# Patient Record
Sex: MALE | Race: BLACK OR AFRICAN AMERICAN | NOT HISPANIC OR LATINO
[De-identification: names, ages, dates, MRNs, and addresses within clinical notes are randomized per-mention and may not be internally consistent; named-entity substitution may affect disease eponyms.]

---

## 2023-03-06 ENCOUNTER — NON-APPOINTMENT (OUTPATIENT)
Age: 53
End: 2023-03-06

## 2023-03-06 ENCOUNTER — APPOINTMENT (OUTPATIENT)
Dept: ELECTROPHYSIOLOGY | Facility: CLINIC | Age: 53
End: 2023-03-06
Payer: COMMERCIAL

## 2023-03-06 VITALS
DIASTOLIC BLOOD PRESSURE: 99 MMHG | HEART RATE: 84 BPM | SYSTOLIC BLOOD PRESSURE: 160 MMHG | WEIGHT: 315 LBS | OXYGEN SATURATION: 98 % | HEIGHT: 75 IN | BODY MASS INDEX: 39.17 KG/M2 | RESPIRATION RATE: 14 BRPM

## 2023-03-06 DIAGNOSIS — I42.9 CARDIOMYOPATHY, UNSPECIFIED: ICD-10-CM

## 2023-03-06 DIAGNOSIS — Z95.810 PRESENCE OF AUTOMATIC (IMPLANTABLE) CARDIAC DEFIBRILLATOR: ICD-10-CM

## 2023-03-06 PROCEDURE — 99203 OFFICE O/P NEW LOW 30 MIN: CPT | Mod: 25

## 2023-03-06 PROCEDURE — 93284 PRGRMG EVAL IMPLANTABLE DFB: CPT

## 2023-03-06 PROCEDURE — 93000 ELECTROCARDIOGRAM COMPLETE: CPT | Mod: 59

## 2023-03-06 RX ORDER — SACUBITRIL AND VALSARTAN 97; 103 MG/1; MG/1
97-103 TABLET, FILM COATED ORAL
Qty: 180 | Refills: 0 | Status: ACTIVE | COMMUNITY
Start: 2023-02-15

## 2023-03-06 RX ORDER — ATORVASTATIN CALCIUM 40 MG/1
40 TABLET, FILM COATED ORAL
Qty: 90 | Refills: 0 | Status: ACTIVE | COMMUNITY
Start: 2023-01-09

## 2023-03-06 RX ORDER — APIXABAN 5 MG/1
5 TABLET, FILM COATED ORAL
Qty: 180 | Refills: 0 | Status: ACTIVE | COMMUNITY
Start: 2022-03-23

## 2023-03-06 NOTE — DISCUSSION/SUMMARY
[EKG obtained to assist in diagnosis and management of assessed problem(s)] : EKG obtained to assist in diagnosis and management of assessed problem(s) [FreeTextEntry1] : In summary the patient is a 52-year-old gentleman with dilated cardiomyopathy and left bundle branch block status postplacement of a biventricular ICD in 2014.  He has a issue with the ICD lead pertaining to the SVC coil.  The device is now at SHANON.  We discussed the options of simply performing a generator change as the remainder of the ICD lead is currently functioning normally versus an extraction with reimplantation.  The concern is that if a portion of the ICD lead is beginning to fail and portends future failure of the lead.  Since we have to go into the pocket to do a generator change, which exposes the patient to an infectious risk one option would be to deal with the ICD lead now.  In addition a 9-year-old ICD lead will be much easier to extract than 1 that has been in for many more years if it fails at 5 or 10 years down the road.  We discussed the procedures, outcomes and risks of extraction at length.  The risks discussed included, but were not limited to bleeding, infection, AV fistula, vascular tear, cardiac tear, valvular damage, need for emergent surgery and death.  After all options were completely reviewed and all questions were answered the patient has decided to proceed with extraction and reimplantation.  We will hold his Eliquis for 3 days prior.

## 2023-03-06 NOTE — HISTORY OF PRESENT ILLNESS
[FreeTextEntry1] : I had the pleasure of seeing your patient Ronnie Holden today in the arrhythmia clinic of [facility].  As you well know the patient is a delightful 52-year-old gentleman with a history of atrial fibrillation, dilated cardiomyopathy, heart failure and left bundle branch block.  The patient had initially presented with heart failure and the work-up revealed a nonischemic cardiomyopathy.  He had a wide left bundle branch block at 180 ms and underwent implantation of a [employer] Marcellus biventricular ICD in July 2014.  The system includes a Medtronic 6947 dual coil ICD lead, a [employer] 4470 atrial lead and a [employer] Marcellus 1458Q LV lead.  In follow-up the patient was found to have an elevated SVC impedance which was out of range.  The coil was programmed out of the circuit and the patient has now reached SHANON (February 18, 2023).  The patient has never received therapy from his device.\par \par The patient's most recent echocardiogram was on August 11, 2022.  There is severe global LV dysfunction and mild diastolic dysfunction.  The ejection fraction was 35%.  There is normal right ventricular size and function and mild mitral regurgitation.\par \par Today in clinic the patient is overall doing well.  He denies any palpitations, lightheadedness, presyncope or syncope.  His blood pressure today was 160/99 and his pulse was 84 and regular.  His EKG revealed sinus rhythm at 82 bpm with biventricular pacing.  Interrogation of his device reveals it reached SHANON on February 18.  The P wave was greater than 5 mV with impedance of 310 ohms and a threshold of 0.5 V at 0.5 ms.  The R wave was 11.7 mV with impedance of 340 ohms and a threshold 0.75 V at 0.5 ms.  The LV threshold was 1 V at 0.5 ms and the RV coil impedance was 66 ohms.  There was only one mode switch episode and he is atrially pacing 1.4% of the time and BiV pacing over 99% of the time.

## 2023-04-18 ENCOUNTER — NON-APPOINTMENT (OUTPATIENT)
Age: 53
End: 2023-04-18

## 2023-04-21 ENCOUNTER — RESULT REVIEW (OUTPATIENT)
Age: 53
End: 2023-04-21

## 2023-04-21 ENCOUNTER — OUTPATIENT (OUTPATIENT)
Dept: OUTPATIENT SERVICES | Facility: HOSPITAL | Age: 53
LOS: 1 days | End: 2023-04-21
Payer: COMMERCIAL

## 2023-04-21 VITALS
WEIGHT: 315 LBS | OXYGEN SATURATION: 97 % | DIASTOLIC BLOOD PRESSURE: 100 MMHG | TEMPERATURE: 99 F | SYSTOLIC BLOOD PRESSURE: 172 MMHG | HEIGHT: 75 IN | HEART RATE: 74 BPM | RESPIRATION RATE: 18 BRPM

## 2023-04-21 DIAGNOSIS — I42.0 DILATED CARDIOMYOPATHY: ICD-10-CM

## 2023-04-21 DIAGNOSIS — Z95.810 PRESENCE OF AUTOMATIC (IMPLANTABLE) CARDIAC DEFIBRILLATOR: Chronic | ICD-10-CM

## 2023-04-21 DIAGNOSIS — G47.33 OBSTRUCTIVE SLEEP APNEA (ADULT) (PEDIATRIC): ICD-10-CM

## 2023-04-21 DIAGNOSIS — I48.91 UNSPECIFIED ATRIAL FIBRILLATION: ICD-10-CM

## 2023-04-21 DIAGNOSIS — Z98.89 OTHER SPECIFIED POSTPROCEDURAL STATES: Chronic | ICD-10-CM

## 2023-04-21 DIAGNOSIS — I10 ESSENTIAL (PRIMARY) HYPERTENSION: ICD-10-CM

## 2023-04-21 LAB
ANION GAP SERPL CALC-SCNC: 11 MMOL/L — SIGNIFICANT CHANGE UP (ref 5–17)
BLD GP AB SCN SERPL QL: NEGATIVE — SIGNIFICANT CHANGE UP
BUN SERPL-MCNC: 12 MG/DL — SIGNIFICANT CHANGE UP (ref 7–23)
CALCIUM SERPL-MCNC: 9.1 MG/DL — SIGNIFICANT CHANGE UP (ref 8.4–10.5)
CHLORIDE SERPL-SCNC: 107 MMOL/L — SIGNIFICANT CHANGE UP (ref 96–108)
CO2 SERPL-SCNC: 23 MMOL/L — SIGNIFICANT CHANGE UP (ref 22–31)
CREAT SERPL-MCNC: 1.05 MG/DL — SIGNIFICANT CHANGE UP (ref 0.5–1.3)
EGFR: 85 ML/MIN/1.73M2 — SIGNIFICANT CHANGE UP
GLUCOSE SERPL-MCNC: 100 MG/DL — HIGH (ref 70–99)
HCT VFR BLD CALC: 42.8 % — SIGNIFICANT CHANGE UP (ref 39–50)
HGB BLD-MCNC: 13.9 G/DL — SIGNIFICANT CHANGE UP (ref 13–17)
MCHC RBC-ENTMCNC: 29.1 PG — SIGNIFICANT CHANGE UP (ref 27–34)
MCHC RBC-ENTMCNC: 32.5 GM/DL — SIGNIFICANT CHANGE UP (ref 32–36)
MCV RBC AUTO: 89.5 FL — SIGNIFICANT CHANGE UP (ref 80–100)
NRBC # BLD: 0 /100 WBCS — SIGNIFICANT CHANGE UP (ref 0–0)
PLATELET # BLD AUTO: 264 K/UL — SIGNIFICANT CHANGE UP (ref 150–400)
POTASSIUM SERPL-MCNC: 3.9 MMOL/L — SIGNIFICANT CHANGE UP (ref 3.5–5.3)
POTASSIUM SERPL-SCNC: 3.9 MMOL/L — SIGNIFICANT CHANGE UP (ref 3.5–5.3)
RBC # BLD: 4.78 M/UL — SIGNIFICANT CHANGE UP (ref 4.2–5.8)
RBC # FLD: 12.6 % — SIGNIFICANT CHANGE UP (ref 10.3–14.5)
RH IG SCN BLD-IMP: POSITIVE — SIGNIFICANT CHANGE UP
SODIUM SERPL-SCNC: 141 MMOL/L — SIGNIFICANT CHANGE UP (ref 135–145)
WBC # BLD: 7.9 K/UL — SIGNIFICANT CHANGE UP (ref 3.8–10.5)
WBC # FLD AUTO: 7.9 K/UL — SIGNIFICANT CHANGE UP (ref 3.8–10.5)

## 2023-04-21 PROCEDURE — G0463: CPT

## 2023-04-21 PROCEDURE — 86850 RBC ANTIBODY SCREEN: CPT

## 2023-04-21 PROCEDURE — 86900 BLOOD TYPING SEROLOGIC ABO: CPT

## 2023-04-21 PROCEDURE — 36415 COLL VENOUS BLD VENIPUNCTURE: CPT

## 2023-04-21 PROCEDURE — 71046 X-RAY EXAM CHEST 2 VIEWS: CPT

## 2023-04-21 PROCEDURE — 86901 BLOOD TYPING SEROLOGIC RH(D): CPT

## 2023-04-21 PROCEDURE — 86923 COMPATIBILITY TEST ELECTRIC: CPT

## 2023-04-21 PROCEDURE — 71046 X-RAY EXAM CHEST 2 VIEWS: CPT | Mod: 26

## 2023-04-21 PROCEDURE — 80048 BASIC METABOLIC PNL TOTAL CA: CPT

## 2023-04-21 PROCEDURE — 85027 COMPLETE CBC AUTOMATED: CPT

## 2023-04-21 RX ORDER — CEFUROXIME AXETIL 250 MG
1500 TABLET ORAL ONCE
Refills: 0 | Status: COMPLETED | OUTPATIENT
Start: 2023-04-25 | End: 2023-04-25

## 2023-04-21 RX ORDER — CHLORHEXIDINE GLUCONATE 213 G/1000ML
1 SOLUTION TOPICAL ONCE
Refills: 0 | Status: DISCONTINUED | OUTPATIENT
Start: 2023-04-25 | End: 2023-04-25

## 2023-04-21 RX ORDER — LIDOCAINE HCL 20 MG/ML
0.2 VIAL (ML) INJECTION ONCE
Refills: 0 | Status: DISCONTINUED | OUTPATIENT
Start: 2023-04-25 | End: 2023-04-25

## 2023-04-21 RX ORDER — SODIUM CHLORIDE 9 MG/ML
3 INJECTION INTRAMUSCULAR; INTRAVENOUS; SUBCUTANEOUS EVERY 8 HOURS
Refills: 0 | Status: DISCONTINUED | OUTPATIENT
Start: 2023-04-25 | End: 2023-04-25

## 2023-04-21 NOTE — H&P PST ADULT - NSICDXFAMILYHX_GEN_ALL_CORE_FT
FAMILY HISTORY:  Family history of CHF (congestive heart failure), father  age 66yr  Family history of diabetes mellitus, mother  61 yr - hemorrhage

## 2023-04-21 NOTE — H&P PST ADULT - ASSESSMENT
DASI Score:  DASI Activity: able to go up one flight of stairs or walk 1-2 blocks with out difficulty  Loose or removable teeth: denies      CAPRINI SCORE    AGE RELATED RISK FACTORS                                                       MOBILITY RELATED FACTORS  [ ] Age 41-60 years                                            (1 Point)                  [ ] Bed rest                                                        (1 Point)  [ ] Age: 61-74 years                                           (2 Points)                [ ] Plaster cast                                                   (2 Points)  [ ] Age= 75 years                                              (3 Points)                 [ ] Bed bound for more than 72 hours                   (2 Points)    DISEASE RELATED RISK FACTORS                                               GENDER SPECIFIC FACTORS  [ ] Edema in the lower extremities                       (1 Point)                  [ ] Pregnancy                                                     (1 Point)  [ ] Varicose veins                                               (1 Point)                  [ ] Post-partum < 6 weeks                                   (1 Point)             [ ] BMI > 25 Kg/m2                                            (1 Point)                  [ ] Hormonal therapy  or oral contraception            (1 Point)                 [ ] Sepsis (in the previous month)                        (1 Point)                  [ ] History of pregnancy complications  [ ] Pneumonia or serious lung disease                                               [ ] Unexplained or recurrent                       (1 Point)           (in the previous month)                               (1 Point)  [ ] Abnormal pulmonary function test                     (1 Point)                 SURGERY RELATED RISK FACTORS  [ ] Acute myocardial infarction                              (1 Point)                 [ ]  Section                                            (1 Point)  [ ] Congestive heart failure (in the previous month)  (1 Point)                 [ ] Minor surgery                                                 (1 Point)   [ ] Inflammatory bowel disease                             (1 Point)                 [ ] Arthroscopic surgery                                        (2 Points)  [ ] Central venous access                                    (2 Points)                [ ] General surgery lasting more than 45 minutes   (2 Points)       [ ] Stroke (in the previous month)                          (5 Points)               [ ] Elective arthroplasty                                        (5 Points)                                                                                                                                               HEMATOLOGY RELATED FACTORS                                                 TRAUMA RELATED RISK FACTORS  [ ] Prior episodes of VTE                                     (3 Points)                 [ ] Fracture of the hip, pelvis, or leg                       (5 Points)  [ ] Positive family history for VTE                         (3 Points)                 [ ] Acute spinal cord injury (in the previous month)  (5 Points)  [ ] Prothrombin 97668 A                                      (3 Points)                 [ ] Paralysis  (less than 1 month)                          (5 Points)  [ ] Factor V Leiden                                             (3 Points)                 [ ] Multiple Trauma within 1 month                         (5 Points)  [ ] Lupus anticoagulants                                     (3 Points)                                                           [ ] Anticardiolipin antibodies                                (3 Points)                                                       [ ] High homocysteine in the blood                      (3 Points)                                             [ ] Other congenital or acquired thrombophilia       (3 Points)                                                [ ] Heparin induced thrombocytopenia                  (3 Points)                                          Total Score [          ] DASI Score: 7  DASI Activity: Performs all self care, works, drives  Loose or removable teeth: denies      CAPRINI SCORE    AGE RELATED RISK FACTORS                                                       MOBILITY RELATED FACTORS  [x ] Age 41-60 years                                            (1 Point)                  [ ] Bed rest                                                        (1 Point)  [ ] Age: 61-74 years                                           (2 Points)                [ ] Plaster cast                                                   (2 Points)  [ ] Age= 75 years                                              (3 Points)                 [ ] Bed bound for more than 72 hours                   (2 Points)    DISEASE RELATED RISK FACTORS                                               GENDER SPECIFIC FACTORS  [ ] Edema in the lower extremities                       (1 Point)                  [ ] Pregnancy                                                     (1 Point)  [ ] Varicose veins                                               (1 Point)                  [ ] Post-partum < 6 weeks                                   (1 Point)             [x ] BMI > 25 Kg/m2                                            (1 Point)                  [ ] Hormonal therapy  or oral contraception            (1 Point)                 [ ] Sepsis (in the previous month)                        (1 Point)                  [ ] History of pregnancy complications  [ ] Pneumonia or serious lung disease                                               [ ] Unexplained or recurrent                       (1 Point)           (in the previous month)                               (1 Point)  [ ] Abnormal pulmonary function test                     (1 Point)                 SURGERY RELATED RISK FACTORS  [ ] Acute myocardial infarction                              (1 Point)                 [ ]  Section                                            (1 Point)  [ ] Congestive heart failure (in the previous month)  (1 Point)                 [ ] Minor surgery                                                 (1 Point)   [ ] Inflammatory bowel disease                             (1 Point)                 [ ] Arthroscopic surgery                                        (2 Points)  [ ] Central venous access                                    (2 Points)                [x ] General surgery lasting more than 45 minutes   (2 Points)       [ ] Stroke (in the previous month)                          (5 Points)               [ ] Elective arthroplasty                                        (5 Points)                                                                                                                                               HEMATOLOGY RELATED FACTORS                                                 TRAUMA RELATED RISK FACTORS  [ ] Prior episodes of VTE                                     (3 Points)                 [ ] Fracture of the hip, pelvis, or leg                       (5 Points)  [ ] Positive family history for VTE                         (3 Points)                 [ ] Acute spinal cord injury (in the previous month)  (5 Points)  [ ] Prothrombin 27746 A                                      (3 Points)                 [ ] Paralysis  (less than 1 month)                          (5 Points)  [ ] Factor V Leiden                                             (3 Points)                 [ ] Multiple Trauma within 1 month                         (5 Points)  [ ] Lupus anticoagulants                                     (3 Points)                                                           [ ] Anticardiolipin antibodies                                (3 Points)                                                       [ ] High homocysteine in the blood                      (3 Points)                                             [ ] Other congenital or acquired thrombophilia       (3 Points)                                                [ ] Heparin induced thrombocytopenia                  (3 Points)                                          Total Score [  5   ]

## 2023-04-21 NOTE — H&P PST ADULT - NSICDXPASTSURGICALHX_GEN_ALL_CORE_FT
Gab Alfonso Patient Age: 35 year old  MESSAGE: Interpreting service used: No    Insurance on file confirmed with caller: Yes    IM/FP- Orders- Order Request-      Type of order being requested: Vaccination- Td-Tetanus and Diphtheria-      Additional Information: Pt say's he is over due for this and he may poked himself with something, not sure what     Has this been discussed with the provider previously? No- Route message to provider's clinical support pool              Message read back to caller for accuracy: Yes       ALLERGIES:  Dust and Doxycycline hyclate  Current Outpatient Medications   Medication Sig Dispense Refill   • ALPRAZolam (XANAX) 0.25 MG tablet Take 1 tablet by mouth 3 times daily as needed for Anxiety. 30 tablet 0   • ID Now COVID-19 Kit TEST AS DIRECTED     • Multiple Vitamin (MULTIVITAMIN) tablet Take 1 tablet by mouth daily.        No current facility-administered medications for this visit.     PHARMACY to use: see below           Pharmacy preference(s) on file:   [id number] - [place], [place] - [address] AT [facility]  [address]  [facility] [address]  Phone: [phone] Fax: [phone]      CALL BACK INFO: Ok to leave response (including medical information) on answering machine      PCP: Ren Shin MD         INS: Payor: [employer]UE [employer] [place] / Plan: PPO [id number] / Product Type: PPO MISC   PATIENT ADDRESS:  [address] [place] [id number]    
Left message for patient to call back    
Ok   
To Dr Kip Ramirez for Tdap as non clinical  
patient calling has questions about being on antibiotic at this time an if he can schedule transferred to [facility]    Caller returning call to clinical team.     Connected to [facility]- [place]- Connect call to [facility] queue- Route message to provider's clinical support pool  
scheduled  
0 = independent
PAST SURGICAL HISTORY:  H/O eye surgery age 7 yr -  blind    History of implantable cardiac defibrillator (ICD)

## 2023-04-21 NOTE — H&P PST ADULT - HISTORY OF PRESENT ILLNESS
This is a 53 year old male with past medical history HTN, HLD, AFIB on Eliquis, dilated cardio This is a 53 year old obese male with past medical history HTN, HLD, AFIB on Eliquis, dilated CM on Entresto and LBBB with Abbot ICD implanted originally in 2014. Device currently at SHANON and issue with ICD lead/ SVC coil. Today presenting to [facility] for scheduled ICD lead extraction and ICD reimplant on 4/25 with Dr. Martinez  This is a 53 year old obese male with past medical history HTN, HLD, AFIB on Eliquis, HF, dilated CM on Entresto and LBBB with Abbot BIv-ICD implanted originally in 2014. Device currently at SHANON and issue with ICD lead/ SVC coil. Denies ever having shock therapy.  Today presenting to PST for scheduled ICD lead extraction and ICD reimplant on 4/25 with Dr. Martinez

## 2023-04-21 NOTE — H&P PST ADULT - NSANTHOSAYNRD_GEN_A_CORE
JOSÉ MIGUEL precautions/No. JOSÉ MIGUEL screening performed.  STOP BANG Legend: 0-2 = LOW Risk; 3-4 = INTERMEDIATE Risk; 5-8 = HIGH Risk

## 2023-04-21 NOTE — H&P PST ADULT - ATTENDING COMMENTS
seen and agree  no significant changes from my past clinic visit  please see that note for details of the discussion

## 2023-04-21 NOTE — H&P PST ADULT - NSICDXPASTMEDICALHX_GEN_ALL_CORE_FT
PAST MEDICAL HISTORY:  Blind right eye post trauma    CHF (congestive heart failure)     GERD (gastroesophageal reflux disease)     HLD (hyperlipidemia)     HTN (hypertension)     Morbid obesity

## 2023-04-25 ENCOUNTER — TRANSCRIPTION ENCOUNTER (OUTPATIENT)
Age: 53
End: 2023-04-25

## 2023-04-25 ENCOUNTER — OUTPATIENT (OUTPATIENT)
Dept: INPATIENT UNIT | Facility: HOSPITAL | Age: 53
LOS: 1 days | Discharge: HOME | End: 2023-04-25
Payer: COMMERCIAL

## 2023-04-25 VITALS
WEIGHT: 315 LBS | OXYGEN SATURATION: 98 % | TEMPERATURE: 99 F | DIASTOLIC BLOOD PRESSURE: 98 MMHG | RESPIRATION RATE: 18 BRPM | HEIGHT: 75 IN | SYSTOLIC BLOOD PRESSURE: 155 MMHG | HEART RATE: 71 BPM

## 2023-04-25 DIAGNOSIS — Z95.810 PRESENCE OF AUTOMATIC (IMPLANTABLE) CARDIAC DEFIBRILLATOR: Chronic | ICD-10-CM

## 2023-04-25 DIAGNOSIS — Z98.89 OTHER SPECIFIED POSTPROCEDURAL STATES: Chronic | ICD-10-CM

## 2023-04-25 DIAGNOSIS — I42.0 DILATED CARDIOMYOPATHY: ICD-10-CM

## 2023-04-25 PROCEDURE — 71046 X-RAY EXAM CHEST 2 VIEWS: CPT | Mod: 26

## 2023-04-25 PROCEDURE — 93010 ELECTROCARDIOGRAM REPORT: CPT

## 2023-04-25 DEVICE — KIT BRIDGE ACESSORY
Type: IMPLANTABLE DEVICE | Status: NON-FUNCTIONAL
Removed: 2023-04-25

## 2023-04-25 DEVICE — KIT A-LINE 1LUM 20G X 12CM SAFE KIT
Type: IMPLANTABLE DEVICE | Status: NON-FUNCTIONAL
Removed: 2023-04-25

## 2023-04-25 DEVICE — DEVICE LOCKING LOCKING LLD EZ CLEARS
Type: IMPLANTABLE DEVICE | Status: NON-FUNCTIONAL
Removed: 2023-04-25

## 2023-04-25 DEVICE — COIL COMPRESSION ONE-TIE
Type: IMPLANTABLE DEVICE | Status: NON-FUNCTIONAL
Removed: 2023-04-25

## 2023-04-25 DEVICE — IMPLANTABLE DEVICE
Type: IMPLANTABLE DEVICE | Status: NON-FUNCTIONAL
Removed: 2023-04-25

## 2023-04-25 DEVICE — LEAD LOK DVC
Type: IMPLANTABLE DEVICE | Status: NON-FUNCTIONAL
Removed: 2023-04-25

## 2023-04-25 DEVICE — SHEATH PRELUDE 7FX25CM
Type: IMPLANTABLE DEVICE | Status: NON-FUNCTIONAL
Removed: 2023-04-25

## 2023-04-25 DEVICE — SHEATH GLIDELIGHT LASER 16FR
Type: IMPLANTABLE DEVICE | Status: NON-FUNCTIONAL
Removed: 2023-04-25

## 2023-04-25 DEVICE — SURGICEL FIBRILLAR 2 X 4"
Type: IMPLANTABLE DEVICE | Status: NON-FUNCTIONAL
Removed: 2023-04-25

## 2023-04-25 DEVICE — LEAD PACE VENT CAPSUR Z 52CM
Type: IMPLANTABLE DEVICE | Status: NON-FUNCTIONAL
Removed: 2023-04-25

## 2023-04-25 DEVICE — GWIRE ANGL .035X80 STD
Type: IMPLANTABLE DEVICE | Status: NON-FUNCTIONAL
Removed: 2023-04-25

## 2023-04-25 RX ORDER — ATORVASTATIN CALCIUM 80 MG/1
1 TABLET, FILM COATED ORAL
Refills: 0 | DISCHARGE

## 2023-04-25 RX ORDER — CARVEDILOL PHOSPHATE 80 MG/1
1 CAPSULE, EXTENDED RELEASE ORAL
Refills: 0 | DISCHARGE

## 2023-04-25 RX ORDER — ACETAMINOPHEN 500 MG
650 TABLET ORAL EVERY 6 HOURS
Refills: 0 | Status: DISCONTINUED | OUTPATIENT
Start: 2023-04-25 | End: 2023-04-26

## 2023-04-25 RX ORDER — OXYCODONE HYDROCHLORIDE 5 MG/1
5 TABLET ORAL EVERY 6 HOURS
Refills: 0 | Status: DISCONTINUED | OUTPATIENT
Start: 2023-04-25 | End: 2023-04-26

## 2023-04-25 RX ORDER — SACUBITRIL AND VALSARTAN 24; 26 MG/1; MG/1
1 TABLET, FILM COATED ORAL
Refills: 0 | Status: DISCONTINUED | OUTPATIENT
Start: 2023-04-25 | End: 2023-04-26

## 2023-04-25 RX ORDER — OXYCODONE HYDROCHLORIDE 5 MG/1
10 TABLET ORAL EVERY 6 HOURS
Refills: 0 | Status: DISCONTINUED | OUTPATIENT
Start: 2023-04-25 | End: 2023-04-26

## 2023-04-25 RX ORDER — LABETALOL HCL 100 MG
10 TABLET ORAL ONCE
Refills: 0 | Status: COMPLETED | OUTPATIENT
Start: 2023-04-25 | End: 2023-04-25

## 2023-04-25 RX ORDER — FENTANYL CITRATE 50 UG/ML
25 INJECTION INTRAVENOUS
Refills: 0 | Status: DISCONTINUED | OUTPATIENT
Start: 2023-04-25 | End: 2023-04-25

## 2023-04-25 RX ORDER — FUROSEMIDE 40 MG
80 TABLET ORAL DAILY
Refills: 0 | Status: DISCONTINUED | OUTPATIENT
Start: 2023-04-25 | End: 2023-04-26

## 2023-04-25 RX ORDER — AMLODIPINE BESYLATE 2.5 MG/1
5 TABLET ORAL DAILY
Refills: 0 | Status: DISCONTINUED | OUTPATIENT
Start: 2023-04-25 | End: 2023-04-26

## 2023-04-25 RX ORDER — ONDANSETRON 8 MG/1
4 TABLET, FILM COATED ORAL EVERY 6 HOURS
Refills: 0 | Status: DISCONTINUED | OUTPATIENT
Start: 2023-04-25 | End: 2023-04-25

## 2023-04-25 RX ORDER — HYDRALAZINE HCL 50 MG
10 TABLET ORAL ONCE
Refills: 0 | Status: COMPLETED | OUTPATIENT
Start: 2023-04-25 | End: 2023-04-25

## 2023-04-25 RX ORDER — SACUBITRIL AND VALSARTAN 24; 26 MG/1; MG/1
1 TABLET, FILM COATED ORAL
Refills: 0 | DISCHARGE

## 2023-04-25 RX ORDER — CARVEDILOL PHOSPHATE 80 MG/1
25 CAPSULE, EXTENDED RELEASE ORAL EVERY 12 HOURS
Refills: 0 | Status: DISCONTINUED | OUTPATIENT
Start: 2023-04-25 | End: 2023-04-26

## 2023-04-25 RX ORDER — FENTANYL CITRATE 50 UG/ML
50 INJECTION INTRAVENOUS
Refills: 0 | Status: DISCONTINUED | OUTPATIENT
Start: 2023-04-25 | End: 2023-04-25

## 2023-04-25 RX ORDER — ATORVASTATIN CALCIUM 80 MG/1
40 TABLET, FILM COATED ORAL AT BEDTIME
Refills: 0 | Status: DISCONTINUED | OUTPATIENT
Start: 2023-04-25 | End: 2023-04-26

## 2023-04-25 RX ADMIN — AMLODIPINE BESYLATE 5 MILLIGRAM(S): 2.5 TABLET ORAL at 15:58

## 2023-04-25 RX ADMIN — SACUBITRIL AND VALSARTAN 1 TABLET(S): 24; 26 TABLET, FILM COATED ORAL at 22:00

## 2023-04-25 RX ADMIN — CARVEDILOL PHOSPHATE 25 MILLIGRAM(S): 80 CAPSULE, EXTENDED RELEASE ORAL at 22:00

## 2023-04-25 RX ADMIN — Medication 10 MILLIGRAM(S): at 14:12

## 2023-04-25 RX ADMIN — Medication 10 MILLIGRAM(S): at 13:35

## 2023-04-25 RX ADMIN — Medication 10 MILLIGRAM(S): at 12:45

## 2023-04-25 RX ADMIN — ATORVASTATIN CALCIUM 40 MILLIGRAM(S): 80 TABLET, FILM COATED ORAL at 22:00

## 2023-04-25 RX ADMIN — Medication 80 MILLIGRAM(S): at 15:57

## 2023-04-25 NOTE — PRE-OP CHECKLIST - BETA DATE TIME
Noted, thank you. Writer called patient and informed her that per Juan Antonio Jacobs continue Budesonide until we have everything to start the Stelara. She is aware she still has refills on file at the pharmacy and will call to get those filled. She verbalized understanding that we will be contacting her once we hear back on the approval for at home Stelara injections.    25-Apr-2023 06:00

## 2023-04-25 NOTE — CONSULT NOTE ADULT - SUBJECTIVE AND OBJECTIVE BOX
C A R D I O L O G Y  *********************    DATE OF SERVICE: 23    HISTORY OF PRESENT ILLNESS: HPI:  Patient is a 52 y/o Male well known to our office with PMH of HTN, HLD, PAF on Eliquis, NICM/HFrEF and LBBB s/p [employer]cellus BiV-ICD () who was found to have device at SHANON and issue with ICD lead/SVC coil now admitted s/p ICD lead extraction and reimplantation of BiV-ICD on . Patient seen earlier in recovery with mild post op incisional chest pain. Only issue post op was patient hypertensive receiving IV hydralazine and labetalol per RN. Denies SOB, palpitations, dizziness, or syncope.    PAST MEDICAL & SURGICAL HISTORY:  Morbid obesity      HTN (hypertension)      HLD (hyperlipidemia)      CHF (congestive heart failure)      GERD (gastroesophageal reflux disease)      Blind  right eye post trauma      H/O eye surgery  age 7 yr -  blind      History of implantable cardiac defibrillator (ICD)              MEDICATIONS:  MEDICATIONS  (STANDING):  amLODIPine   Tablet 5 milliGRAM(s) Oral daily  atorvastatin 40 milliGRAM(s) Oral at bedtime  carvedilol 25 milliGRAM(s) Oral every 12 hours  furosemide    Tablet 80 milliGRAM(s) Oral daily  sacubitril 97 mG/valsartan 103 mG 1 Tablet(s) Oral two times a day      Allergies    No Known Allergies    Intolerances        FAMILY HISTORY:  Family history of CHF (congestive heart failure)  father  age 66yr    Family history of diabetes mellitus  mother  61 yr - hemorrhage      Non-contributary for premature coronary disease or sudden cardiac death    SOCIAL HISTORY:    [ x] Non-smoker  [ ] Smoker  [ ] Alcohol    FLU VACCINE THIS YEAR STARTS IN AUGUST:  [ ] Yes    [ ] No    IF OVER 65 HAVE YOU EVER HAD A PNA VACCINE:  [ ] Yes    [ ] No       [ ] N/A      REVIEW OF SYSTEMS:  [x ]chest pain  [  ]shortness of breath  [  ]palpitations  [  ]syncope  [ ]near syncope [ ]upper extremity weakness   [ ] lower extremity weakness  [  ]diplopia  [  ]altered mental status   [  ]fevers  [ ]chills [ ]nausea  [ ]vomiting  [  ]dysphagia    [ ]abdominal pain  [ ]melena  [ ]BRBPR    [  ]epistaxis  [  ]rash    [ ]lower extremity edema        [X] All others negative	  [ ] Unable to obtain      LABS:	 	    CARDIAC MARKERS:          Hb Trend:           Creatinine Trend: 1.05<--    Coags:      proBNP:   Lipid Profile:   HgA1c:   TSH:         PHYSICAL EXAM:  T(C): 36.7 (23 @ 15:00), Max: 37.2 (23 @ 08:01)  HR: 77 (23 @ 16:00) (66 - 79)  BP: 169/94 (23 @ 16:00) (155/98 - 185/110)  RR: 14 (23 @ 16:00) (14 - 18)  SpO2: 97% (23 @ 16:00) (94% - 99%)  Wt(kg): --   BMI (kg/m2): 48.4 (23 @ 08:29)  I&O's Summary    2023 07:01  -  2023 16:20  --------------------------------------------------------  IN: 0 mL / OUT: 300 mL / NET: -300 mL        Gen: NAD  HEENT:  (-)icterus (-)pallor  CV: N S1 S2 1/6 TESSA (+)2 Pulses B/l  Resp:  Clear to auscultation B/L, normal effort  GI: (+) BS Soft, NT, ND  Lymph:  (-)Edema, (-)obvious lymphadenopathy  Skin: Warm to touch, Normal turgor  Psych: Appropriate mood and affect      TELEMETRY: SR/	      ECG: NSR, LBBB 	    RADIOLOGY:         CXR: Pending    ASSESSMENT/PLAN: Patient is a 52 y/o Male well known to our office with PMH of HTN, HLD, PAF on Eliquis, NICM/HFrEF and LBBB s/p [employer]us BiV-ICD () who was found to have device at SHANON and issue with ICD lead/SVC coil now admitted s/p ICD lead extraction and reimplantation of BiV-ICD on .    #s/p ICD lead extraction and reimplantation of BiV-ICD  - Post op care per EP  - CXR pending    #Chronic NICM/HFrEF  - Appears well compensated from CHF perspective - continue PO lasix  - Continue home GDMT with Coreg and Entresto    #HTN  - s/p IV hydralazine and labetalol post op  - Continue Coreg, Entresto, Lasix, and Amlodipine    #PAF  - Restart Eliquis for stroke prevention when ok with EP    #HLD  - Continue Lipitor    - Patient to f/u with Dr. Falk after discharge    JADON TrevizoC  Pager: [phone]

## 2023-04-25 NOTE — PATIENT PROFILE ADULT - SAFE PLACE TO LIVE
Sofía Lawton was seen today for hypertension and hyperlipidemia. Diagnoses and all orders for this visit:    Essential hypertension  -     Good control.  -     Continue meds and lifestyle control. CKD (chronic kidney disease) stage 2, GFR 60-89 ml/min  -     Good control.  -     Continue meds and lifestyle control. Mixed hyperlipidemia  -     Good control.  -     Continue meds and lifestyle control. Benign prostatic hyperplasia with nocturia  Trial fish oil at bedtime and /or dinner. Buy Burpless type. Omega 3 fatty acids such as are found in fish oil can decrease urinary urgency and frequency. Each capsule of fish oil should have 800 mg or more of a combination of EPA & DHA - the active omega 3's- per capsule and you take 1 capsule 3 times per day. Or just at dinner and or bedtime. The oil lines the bladder decreasing irritation of the bladder wall and helping prevent bacteria from sticking to the bladder wall so fewer UTI's occur. Fish oil also lowers triglycerides, helps with dry eyes and helps lubricate joints to lower arthritis pain.       Elevated uric acid in blood
no

## 2023-04-25 NOTE — CONSULT NOTE ADULT - ASSESSMENT
Patient seen and examined, agree with above assessment and plan as transcribed above.    - well compensated from a CHF prospective  - D/C planning when ok with EP    Nikunj Ybarra MD, [facility]  BEEPER [phone]

## 2023-04-25 NOTE — PRE-ANESTHESIA EVALUATION ADULT - NSANTHPMHFT_GEN_ALL_CORE
52 yo M h/o morbid obesity, DCM EF 10%, HTN, HLD, lead malfunction here for lead replacement  ET > 4 METS, recent neg stress, on RA  No Dysphagia  ROS otherwise neg

## 2023-04-26 ENCOUNTER — TRANSCRIPTION ENCOUNTER (OUTPATIENT)
Age: 53
End: 2023-04-26

## 2023-04-26 VITALS
HEART RATE: 75 BPM | TEMPERATURE: 98 F | SYSTOLIC BLOOD PRESSURE: 144 MMHG | OXYGEN SATURATION: 98 % | DIASTOLIC BLOOD PRESSURE: 87 MMHG | RESPIRATION RATE: 15 BRPM

## 2023-04-26 DIAGNOSIS — E78.5 HYPERLIPIDEMIA, UNSPECIFIED: ICD-10-CM

## 2023-04-26 LAB
ANION GAP SERPL CALC-SCNC: 12 MMOL/L — SIGNIFICANT CHANGE UP (ref 5–17)
BUN SERPL-MCNC: 14 MG/DL — SIGNIFICANT CHANGE UP (ref 7–23)
CALCIUM SERPL-MCNC: 9 MG/DL — SIGNIFICANT CHANGE UP (ref 8.4–10.5)
CHLORIDE SERPL-SCNC: 105 MMOL/L — SIGNIFICANT CHANGE UP (ref 96–108)
CO2 SERPL-SCNC: 21 MMOL/L — LOW (ref 22–31)
CREAT SERPL-MCNC: 0.98 MG/DL — SIGNIFICANT CHANGE UP (ref 0.5–1.3)
EGFR: 93 ML/MIN/1.73M2 — SIGNIFICANT CHANGE UP
GLUCOSE SERPL-MCNC: 120 MG/DL — HIGH (ref 70–99)
HCT VFR BLD CALC: 43.7 % — SIGNIFICANT CHANGE UP (ref 39–50)
HGB BLD-MCNC: 14.6 G/DL — SIGNIFICANT CHANGE UP (ref 13–17)
MCHC RBC-ENTMCNC: 29.3 PG — SIGNIFICANT CHANGE UP (ref 27–34)
MCHC RBC-ENTMCNC: 33.4 GM/DL — SIGNIFICANT CHANGE UP (ref 32–36)
MCV RBC AUTO: 87.8 FL — SIGNIFICANT CHANGE UP (ref 80–100)
NRBC # BLD: 0 /100 WBCS — SIGNIFICANT CHANGE UP (ref 0–0)
PLATELET # BLD AUTO: 291 K/UL — SIGNIFICANT CHANGE UP (ref 150–400)
POTASSIUM SERPL-MCNC: 4.4 MMOL/L — SIGNIFICANT CHANGE UP (ref 3.5–5.3)
POTASSIUM SERPL-SCNC: 4.4 MMOL/L — SIGNIFICANT CHANGE UP (ref 3.5–5.3)
RBC # BLD: 4.98 M/UL — SIGNIFICANT CHANGE UP (ref 4.2–5.8)
RBC # FLD: 13 % — SIGNIFICANT CHANGE UP (ref 10.3–14.5)
SODIUM SERPL-SCNC: 138 MMOL/L — SIGNIFICANT CHANGE UP (ref 135–145)
WBC # BLD: 14.34 K/UL — HIGH (ref 3.8–10.5)
WBC # FLD AUTO: 14.34 K/UL — HIGH (ref 3.8–10.5)

## 2023-04-26 PROCEDURE — 85027 COMPLETE CBC AUTOMATED: CPT

## 2023-04-26 PROCEDURE — C1892: CPT

## 2023-04-26 PROCEDURE — 71046 X-RAY EXAM CHEST 2 VIEWS: CPT

## 2023-04-26 PROCEDURE — C1894: CPT

## 2023-04-26 PROCEDURE — 33244 REMOVE ELCTRD TRANSVENOUSLY: CPT

## 2023-04-26 PROCEDURE — 80048 BASIC METABOLIC PNL TOTAL CA: CPT

## 2023-04-26 PROCEDURE — 33225 L VENTRIC PACING LEAD ADD-ON: CPT

## 2023-04-26 PROCEDURE — 93287 PERI-PX DEVICE EVAL & PRGR: CPT | Mod: 59

## 2023-04-26 PROCEDURE — C1882: CPT

## 2023-04-26 PROCEDURE — C1773: CPT

## 2023-04-26 PROCEDURE — 33249 INSJ/RPLCMT DEFIB W/LEAD(S): CPT

## 2023-04-26 PROCEDURE — 76000 FLUOROSCOPY <1 HR PHYS/QHP: CPT

## 2023-04-26 PROCEDURE — C1777: CPT

## 2023-04-26 PROCEDURE — C9399: CPT

## 2023-04-26 PROCEDURE — C2629: CPT

## 2023-04-26 PROCEDURE — 33241 REMOVE PULSE GENERATOR: CPT

## 2023-04-26 PROCEDURE — 93010 ELECTROCARDIOGRAM REPORT: CPT

## 2023-04-26 PROCEDURE — C1898: CPT

## 2023-04-26 PROCEDURE — C1889: CPT

## 2023-04-26 PROCEDURE — C1769: CPT

## 2023-04-26 PROCEDURE — 93005 ELECTROCARDIOGRAM TRACING: CPT

## 2023-04-26 RX ORDER — ACETAMINOPHEN 500 MG
2 TABLET ORAL
Qty: 0 | Refills: 0 | DISCHARGE
Start: 2023-04-26

## 2023-04-26 RX ORDER — APIXABAN 2.5 MG/1
1 TABLET, FILM COATED ORAL
Qty: 0 | Refills: 0 | DISCHARGE

## 2023-04-26 RX ADMIN — CARVEDILOL PHOSPHATE 25 MILLIGRAM(S): 80 CAPSULE, EXTENDED RELEASE ORAL at 09:57

## 2023-04-26 RX ADMIN — AMLODIPINE BESYLATE 5 MILLIGRAM(S): 2.5 TABLET ORAL at 05:56

## 2023-04-26 RX ADMIN — SACUBITRIL AND VALSARTAN 1 TABLET(S): 24; 26 TABLET, FILM COATED ORAL at 09:57

## 2023-04-26 RX ADMIN — Medication 80 MILLIGRAM(S): at 05:56

## 2023-04-26 NOTE — DISCHARGE NOTE PROVIDER - NSDCCPCAREPLAN_GEN_ALL_CORE_FT
PRINCIPAL DISCHARGE DIAGNOSIS  Diagnosis: Dilated cardiomyopathy  Assessment and Plan of Treatment: Continue with follow-up visits to your electrophysiology team and with your home remote device monitoring (if applicable). Continue your medications as prescribed. Monitor your left chest wall site for swelling, bleeding.      SECONDARY DISCHARGE DIAGNOSES  Diagnosis: Afib  Assessment and Plan of Treatment: Continue with your cardiologist and primary care MD. Continue your current medications. Call your physician for palpitations, feelings of rapid heart beat, lightheadedness, or dizziness. Continue Eliquis as prescribed.    Diagnosis: HTN (hypertension)  Assessment and Plan of Treatment: Continue with your blood pressure medications; eat a heart healthy diet with low salt diet; exercise regularly (consult with your physician or cardiologist first); maintain a heart healthy weight; if you smoke - quit (A resource to help you stop smoking is the [employer] – phone number 834-768-2689.); include healthy ways to manage stress. Continue to follow with your primary care physician or cardiologist.    Diagnosis: HLD (hyperlipidemia)  Assessment and Plan of Treatment: Continue with your cholesterol medications. Eat a heart healthy diet that is low in saturated fats and salt, and includes whole grains, fruits, vegetables and lean protein; exercise regularly (consult with your physician or cardiologist first); maintain a heart healthy weight; if you smoke - quit (A resource to help you stop smoking is the [employer] [facility] [employer] – phone number 203-085-9544.). Continue to follow with your primary physician or cardiologist.

## 2023-04-26 NOTE — DISCHARGE NOTE NURSING/CASE MANAGEMENT/SOCIAL WORK - NSDCPEFALRISK_GEN_ALL_CORE
For information on Fall & Injury Prevention, visit: [email or web address] OR  [email or web address] OR  https://www.cdc.gov/steadi/patient.html

## 2023-04-26 NOTE — DISCHARGE NOTE PROVIDER - CARE PROVIDER_API CALL
Therese Martinez)  Internal Medicine  [address] [place] [address]  Phone: [phone]  Fax: [phone]  Follow Up Time:    Therese Martinez)  Internal Medicine  [address] [place] [address]  Phone: [phone]  Fax: [phone]  Established Patient  Scheduled Appointment: 05/08/2023 01:40 PM

## 2023-04-26 NOTE — DISCHARGE NOTE PROVIDER - PROVIDER TOKENS
PROVIDER:[TOKEN:[[id number]]] PROVIDER:[TOKEN:[[id number]],SCHEDULEDAPPT:[05/08/2023],SCHEDULEDAPPTTIME:[01:40 PM],ESTABLISHEDPATIENT:[T]]

## 2023-04-26 NOTE — PROGRESS NOTE ADULT - SUBJECTIVE AND OBJECTIVE BOX
C A R D I O L O G Y  **********************************     DATE OF SERVICE: 04-26-23    Patient denies chest pain or shortness of breath.   Review of systems otherwise negative.  	  MEDICATIONS:  MEDICATIONS  (STANDING):  amLODIPine   Tablet 5 milliGRAM(s) Oral daily  atorvastatin 40 milliGRAM(s) Oral at bedtime  carvedilol 25 milliGRAM(s) Oral every 12 hours  furosemide    Tablet 80 milliGRAM(s) Oral daily  sacubitril 97 mG/valsartan 103 mG 1 Tablet(s) Oral two times a day      LABS:	 	    CARDIAC MARKERS:                                14.6   14.34 )-----------( 291      ( 26 Apr 2023 01:41 )             43.7     Hemoglobin: 14.6 g/dL (04-26 @ 01:41)      04-26    138  |  105  |  14  ----------------------------<  120<H>  4.4   |  21<L>  |  0.98    Ca    9.0      26 Apr 2023 01:41      Creatinine Trend: 0.98<--, 1.05<--    COAGS:       proBNP:   Lipid Profile:   HgA1c:   TSH:       PHYSICAL EXAM:  T(C): 36.6 (04-26-23 @ 09:50), Max: 36.8 (04-25-23 @ 20:00)  HR: 75 (04-26-23 @ 09:50) (71 - 107)  BP: 144/87 (04-26-23 @ 09:50) (126/90 - 169/99)  RR: 15 (04-26-23 @ 09:50) (14 - 17)  SpO2: 98% (04-26-23 @ 09:50) (94% - 98%)  Wt(kg): --  I&O's Summary    25 Apr 2023 07:01  -  26 Apr 2023 07:00  --------------------------------------------------------  IN: 0 mL / OUT: 300 mL / NET: -300 mL    26 Apr 2023 07:01  -  26 Apr 2023 14:56  --------------------------------------------------------  IN: 240 mL / OUT: 0 mL / NET: 240 mL    Gen: Appears well in NAD  HEENT:  (-)icterus (-)pallor  CV: N S1 S2 1/6 TESSA (+)2 Pulses B/l  Resp:  Clear to auscultation B/L, normal effort  GI: (+) BS Soft, NT, ND  Lymph:  (-)Edema, (-)obvious lymphadenopathy  Skin: Warm to touch, Normal turgor  Psych: Appropriate mood and affect      TELEMETRY:  70s	      ASSESSMENT/PLAN: Patient is a 54 y/o Male well known to our office with PMH of HTN, HLD, PAF on Eliquis, NICM/HFrEF and LBBB s/p [employer] BiV-ICD (2014) who was found to have device at [facility] and issue with ICD lead/SVC coil now admitted s/p ICD lead extraction and reimplantation of BiV-ICD on 4/25.    #s/p ICD lead extraction and reimplantation of BiV-ICD  - Post op care per EP    #Chronic NICM/HFrEF  - Appears well compensated from CHF perspective - continue PO lasix  - Continue home GDMT with Coreg and Entresto    #HTN  - Continue Coreg, Entresto, Lasix, and Amlodipine    #PAF  - Restart Eliquis for stroke prevention when ok with EP    #HLD  - Continue Lipitor    - D/C planning per EP  - Patient to f/u with Dr. Falk after discharge    JADON TrevizoC  Pager: [phone]      
HPI:  This is a 53 year old obese male with past medical history HTN, HLD, AFIB on Eliquis, HF, dilated CM on Entresto and LBBB with Abbot BIv-ICD implanted originally in 2014. Device currently at [facility] and issue with ICD lead/ SVC coil. Denies ever having shock therapy.  Today presenting to [facility] for scheduled ICD lead extraction and ICD reimplant on 4/25 with Dr. Martinez  (21 Apr 2023 13:40)    Patient is a 53y old  Male who presents with a chief complaint of " They are changing my defibrillator" (21 Apr 2023 13:40)          Allergies    No Known Allergies    Intolerances        Medications:  acetaminophen     Tablet .. 650 milliGRAM(s) Oral every 6 hours PRN  amLODIPine   Tablet 5 milliGRAM(s) Oral daily  atorvastatin 40 milliGRAM(s) Oral at bedtime  carvedilol 25 milliGRAM(s) Oral every 12 hours  furosemide    Tablet 80 milliGRAM(s) Oral daily  oxyCODONE    IR 5 milliGRAM(s) Oral every 6 hours PRN  oxyCODONE    IR 10 milliGRAM(s) Oral every 6 hours PRN  sacubitril 97 mG/valsartan 103 mG 1 Tablet(s) Oral two times a day      Vitals:  T(C): 36.8 (04-25-23 @ 20:00), Max: 37.2 (04-25-23 @ 08:01)  HR: 100 (04-25-23 @ 20:00) (66 - 107)  BP: 135/81 (04-25-23 @ 20:00) (126/90 - 185/110)  BP(mean): 105 (04-25-23 @ 18:00) (102 - 140)  RR: 16 (04-25-23 @ 20:00) (14 - 18)  SpO2: 96% (04-25-23 @ 20:00) (94% - 99%)  Wt(kg): --  Daily Height in cm: 190.5 (25 Apr 2023 08:29)    Daily   I&O's Summary    25 Apr 2023 07:01  -  26 Apr 2023 00:27  --------------------------------------------------------  IN: 0 mL / OUT: 300 mL / NET: -300 mL          Physical Exam:  Appearance: Normal  Eyes: PERRL, EOMI  HENT: Normal oral muscosa, NC/AT  Cardiovascular: S1S2, RRR, No M/R/G, no JVD, No Lower extremity edema  Procedural Access Site: No hematoma, Non-tender to palpation, 2+ pulse, No bruit, No Ecchymosis  Respiratory: Clear to auscultation bilaterally  Gastrointestinal: Soft, Non tender, Normal Bowel Sounds  Musculoskeletal: No clubbing, No joint deformity   Neurologic: Non-focal  Lymphatic: No lymphadenopathy  Psychiatry: AAOx3, Mood & affect appropriate  Skin: No rashes, No ecchymoses, No cyanosis                  Lipid panel   Hgb A1c         ECG: SR 70 bpm

## 2023-04-26 NOTE — DISCHARGE NOTE NURSING/CASE MANAGEMENT/SOCIAL WORK - PATIENT PORTAL LINK FT
You can access the FollowMyHealth Patient Portal offered by [facility] by registering at the following website: [email or web address]. By joining [facility] [employer] [facility]’s FollowMyHealth portal, you will also be able to view your health information using other applications (apps) compatible with our system.

## 2023-04-26 NOTE — DISCHARGE NOTE PROVIDER - NSDCFUADDAPPT_GEN_ALL_CORE_FT
Call the [facility] Cardiology office, [phone], to confirm your outpatient follow up appointment date.

## 2023-04-26 NOTE — DISCHARGE NOTE PROVIDER - HOSPITAL COURSE
HPI:  This is a 53 year old obese male with past medical history HTN, HLD, AFIB on Eliquis, HF, dilated CM on Entresto and LBBB with Abbot BIv-ICD implanted originally in 2014. Device currently at [facility] and issue with ICD lead/ SVC coil. Denies ever having shock therapy.  Today presenting to [facility] for scheduled ICD lead extraction and ICD reimplant on 4/25 with Dr. Martinez  (21 Apr 2023 13:40).    4/25 s/p RV/RA extraction, Medtronic B/V ICD placement. Left chest wall site without swelling, bleeding.   HPI:  This is a 53 year old obese male with past medical history HTN, HLD, AFIB on Eliquis, HF, dilated CM on Entresto and LBBB with Abbot BIv-ICD implanted originally in 2014. Device currently at [facility] and issue with ICD lead/ SVC coil. Denies ever having shock therapy.  Today presenting to [facility] for scheduled ICD lead extraction and ICD reimplant on 4/25 with Dr. Martinez  (21 Apr 2023 13:40).    4/25 s/p RV/RA extraction, Medtronic B/V ICD placement. Left chest wall site without swelling, bleeding.      4/26 Left ACW site mild swelling, after pressure dressing removed (pocket press) , no bleeding or oozing. Right Fem vein dressing removed no hematoma, DP pulse 2+/2+. Stable for dc to home as per Dr Gipson and Dr. Martinez . HPI:  This is a 53 year old obese male with past medical history HTN, HLD, AFIB on Eliquis, HF, dilated CM on Entresto and LBBB with Abbot BIv-ICD implanted originally in 2014. Device currently at [facility] and issue with ICD lead/ SVC coil. Denies ever having shock therapy.  Today presenting to [facility] for scheduled ICD lead extraction and ICD reimplant on 4/25 with Dr. Martinez  (21 Apr 2023 13:40).    4/25 s/p RV/RA extraction, Medtronic B/V ICD placement. Left chest wall site without swelling, bleeding.      4/26 Left ACW site mild swelling, after pressure dressing removed (pocket press) , no bleeding or oozing. Right Fem vein dressing removed no hematoma, DP pulse 2+/2+. Stable for dc to home as per Dr Gipson and Dr. Martinez .     seen and agree  CXR reviewed  Interrogation/ECG reviewed  Site with slight swelling but looks good  hold Eliquis until Saturday  F/U device clinic    ARSNE Martinez MD

## 2023-04-26 NOTE — DISCHARGE NOTE NURSING/CASE MANAGEMENT/SOCIAL WORK - NSDCFUADDAPPT_GEN_ALL_CORE_FT
Call the [facility] Cardiology office, [phone], to confirm your outpatient follow up appointment date.

## 2023-04-26 NOTE — DISCHARGE NOTE PROVIDER - NSDCMRMEDTOKEN_GEN_ALL_CORE_FT
acetaminophen 325 mg oral tablet: 2 tab(s) orally every 6 hours As needed Mild Pain (1 - 3)  atorvastatin 40 mg oral tablet: 1 orally once a day (at bedtime)  carvedilol 25 mg oral tablet: 1 orally 2 times a day  Eliquis 5 mg oral tablet: 1 orally 2 times a day  Entresto 97 mg-103 mg oral tablet: 1 orally 2 times a day  furosemide 80 mg oral tablet: 1 tab(s) orally once a day   acetaminophen 325 mg oral tablet: 2 tab(s) orally every 6 hours As needed Mild Pain (1 - 3)  atorvastatin 40 mg oral tablet: 1 orally once a day (at bedtime)  carvedilol 25 mg oral tablet: 1 orally 2 times a day  Eliquis 5 mg oral tablet: 1 tablet orally 2 times a day  Entresto 97 mg-103 mg oral tablet: 1 orally 2 times a day  furosemide 80 mg oral tablet: 1 tab(s) orally once a day

## 2023-04-26 NOTE — DISCHARGE NOTE PROVIDER - NSDCCPTREATMENT_GEN_ALL_CORE_FT
PRINCIPAL PROCEDURE  Procedure: Implantation of biv ICD  Findings and Treatment: Medtronic BiV ICD placed to left chest wall

## 2023-04-26 NOTE — PROGRESS NOTE ADULT - ASSESSMENT
54 y/o male with above pmhx, s/p RV/RA extraction and Medtronic BiV ICD placement on 4/25.
Patient seen and examined, agree with above assessment and plan as transcribed above.    - d/c planned for today    Nikunj Ybarra MD, [facility]  BEEPER [phone]

## 2023-05-08 ENCOUNTER — APPOINTMENT (OUTPATIENT)
Dept: ELECTROPHYSIOLOGY | Facility: CLINIC | Age: 53
End: 2023-05-08
Payer: COMMERCIAL

## 2023-05-08 VITALS — DIASTOLIC BLOOD PRESSURE: 106 MMHG | HEART RATE: 84 BPM | SYSTOLIC BLOOD PRESSURE: 158 MMHG | OXYGEN SATURATION: 96 %

## 2023-05-08 PROCEDURE — 93000 ELECTROCARDIOGRAM COMPLETE: CPT | Mod: 59

## 2023-05-08 PROCEDURE — 99024 POSTOP FOLLOW-UP VISIT: CPT

## 2023-05-08 PROCEDURE — 93284 PRGRMG EVAL IMPLANTABLE DFB: CPT

## 2023-05-19 ENCOUNTER — NON-APPOINTMENT (OUTPATIENT)
Age: 53
End: 2023-05-19

## (undated) DEVICE — SUT BIOSYN 4-0 18" P-12

## (undated) DEVICE — DRSG DERMABOND PRINEO 60CM

## (undated) DEVICE — SUT SOFSILK 2-0 18" TIES

## (undated) DEVICE — LAP PAD 18 X 18"

## (undated) DEVICE — SUT POLYSORB 2-0 30" GS-21 UNDYED

## (undated) DEVICE — GLV 8 PROTEXIS (WHITE)

## (undated) DEVICE — NDL HYPO SAFE 25G X 5/8" (ORANGE)

## (undated) DEVICE — WARMING BLANKET DUO-THERM HYPER/HYPOTHERM ADULT

## (undated) DEVICE — DEFIBRILLATOR PAD PRE-CONNECT ADULT/CHILD

## (undated) DEVICE — VISITEC 4X4

## (undated) DEVICE — ELCTR DEFIB PAD PRO-PADZ W 10FT LEAD WIRES ADULT

## (undated) DEVICE — DRSG OPSITE 13.75 X 4"

## (undated) DEVICE — SUT SURGICAL STEEL 6 30" BP-1

## (undated) DEVICE — ELCTR PLASMA BLADE X 3.0S LIGHT

## (undated) DEVICE — SUT PLEDGET PRE PUNCH 4.8 X 9.5 X 1.5 MM

## (undated) DEVICE — DRAPE TOWEL BLUE 17" X 24"

## (undated) DEVICE — DRAPE MAYO STAND 30"

## (undated) DEVICE — PREP CHLORAPREP HI-LITE ORANGE 26ML

## (undated) DEVICE — DRAPE 3/4 SHEET W REINFORCEMENT 56X77"

## (undated) DEVICE — DRAPE CAMERA HANDLE COVER

## (undated) DEVICE — GLV 7.5 PROTEXIS (WHITE)

## (undated) DEVICE — FOLEY TRAY 16FR 5CC LF LUBRISIL ADVANCE TEMP CLOSED

## (undated) DEVICE — NDL HYPO REGULAR BEVEL 22G X 1.5" (TURQUOISE)

## (undated) DEVICE — BLADE SCALPEL SAFETYLOCK #11

## (undated) DEVICE — SUCTION YANKAUER NO CONTROL VENT

## (undated) DEVICE — SUT STAINLESS STEEL 5 18" SCC

## (undated) DEVICE — SAW BLADE MICROAIRE STERNUM 1X34X9.4MM

## (undated) DEVICE — DRAPE 1/2 SHEET 40X57"

## (undated) DEVICE — TUBING BRAT 2 SUCTION ASSEMBLY TWIST LOCK

## (undated) DEVICE — VENODYNE/SCD SLEEVE CALF LARGE

## (undated) DEVICE — GLV 7 PROTEXIS (WHITE)

## (undated) DEVICE — SUT SOFSILK 0 30" V-20

## (undated) DEVICE — SUT ETHIBOND 0 44" EN3

## (undated) DEVICE — SYR LUER LOK 10CC

## (undated) DEVICE — DRAPE IOBAN 23" X 23"

## (undated) DEVICE — GLV 8.5 PROTEXIS (WHITE)

## (undated) DEVICE — MEDICATION LABELS W MARKER

## (undated) DEVICE — SOL IRR POUR H2O 250ML

## (undated) DEVICE — STEALTH CLAMP INSERT FIBRA/FIBRA 90MM

## (undated) DEVICE — BLADE SCALPEL SAFETYLOCK #15

## (undated) DEVICE — SUT DOUBLE 6 WIRE STERNAL

## (undated) DEVICE — SOL IRR POUR NS 0.9% 500ML

## (undated) DEVICE — BLADE SCALPEL SAFETYLOCK #10

## (undated) DEVICE — GLV 6.5 PROTEXIS (WHITE)

## (undated) DEVICE — DRAPE INSTRUMENT POUCH 6.75" X 11"

## (undated) DEVICE — PACK UNIVERSAL CARDIAC

## (undated) DEVICE — POSITIONER FOAM EGG CRATE ULNAR 2PCS (PINK)

## (undated) DEVICE — DRAPE C ARM UNIVERSAL

## (undated) DEVICE — STEALTH CLAMP INSERT FIBRA/FIBRA 60MM

## (undated) DEVICE — WARMING BLANKET FULL UNDERBODY

## (undated) DEVICE — SPECIMEN CONTAINER 100ML

## (undated) DEVICE — SUT BLUNT SZ 5

## (undated) DEVICE — SUT POLYSORB 0 36" GS-25 UNDYED

## (undated) DEVICE — SUT SOFSILK 0 18" TIES

## (undated) DEVICE — STAPLER SKIN VISI-STAT 35 WIDE

## (undated) DEVICE — SOL NORMOSOL-R PH7.4 1000ML

## (undated) DEVICE — SYR ASEPTO